# Patient Record
Sex: FEMALE | Race: OTHER | NOT HISPANIC OR LATINO
[De-identification: names, ages, dates, MRNs, and addresses within clinical notes are randomized per-mention and may not be internally consistent; named-entity substitution may affect disease eponyms.]

---

## 2020-12-21 PROBLEM — Z00.00 ENCOUNTER FOR PREVENTIVE HEALTH EXAMINATION: Status: ACTIVE | Noted: 2020-12-21

## 2020-12-28 ENCOUNTER — APPOINTMENT (OUTPATIENT)
Dept: ANTEPARTUM | Facility: CLINIC | Age: 40
End: 2020-12-28
Payer: COMMERCIAL

## 2020-12-28 ENCOUNTER — ASOB RESULT (OUTPATIENT)
Age: 40
End: 2020-12-28

## 2020-12-28 PROCEDURE — 99072 ADDL SUPL MATRL&STAF TM PHE: CPT

## 2020-12-28 PROCEDURE — 76811 OB US DETAILED SNGL FETUS: CPT

## 2021-03-04 ENCOUNTER — OUTPATIENT (OUTPATIENT)
Dept: OUTPATIENT SERVICES | Facility: HOSPITAL | Age: 41
LOS: 1 days | End: 2021-03-04
Payer: COMMERCIAL

## 2021-03-04 ENCOUNTER — EMERGENCY (EMERGENCY)
Facility: HOSPITAL | Age: 41
LOS: 1 days | End: 2021-03-04
Attending: EMERGENCY MEDICINE
Payer: COMMERCIAL

## 2021-03-04 VITALS
SYSTOLIC BLOOD PRESSURE: 123 MMHG | HEART RATE: 82 BPM | RESPIRATION RATE: 18 BRPM | OXYGEN SATURATION: 100 % | TEMPERATURE: 98 F | DIASTOLIC BLOOD PRESSURE: 71 MMHG

## 2021-03-04 VITALS
RESPIRATION RATE: 16 BRPM | HEART RATE: 80 BPM | SYSTOLIC BLOOD PRESSURE: 117 MMHG | DIASTOLIC BLOOD PRESSURE: 75 MMHG | TEMPERATURE: 99 F

## 2021-03-04 VITALS
HEIGHT: 63 IN | TEMPERATURE: 98 F | WEIGHT: 158.07 LBS | RESPIRATION RATE: 20 BRPM | SYSTOLIC BLOOD PRESSURE: 150 MMHG | DIASTOLIC BLOOD PRESSURE: 92 MMHG | OXYGEN SATURATION: 99 % | HEART RATE: 91 BPM

## 2021-03-04 DIAGNOSIS — O26.899 OTHER SPECIFIED PREGNANCY RELATED CONDITIONS, UNSPECIFIED TRIMESTER: ICD-10-CM

## 2021-03-04 DIAGNOSIS — Z3A.00 WEEKS OF GESTATION OF PREGNANCY NOT SPECIFIED: ICD-10-CM

## 2021-03-04 DIAGNOSIS — N60.02 SOLITARY CYST OF LEFT BREAST: Chronic | ICD-10-CM

## 2021-03-04 LAB
ALBUMIN SERPL ELPH-MCNC: 3.3 G/DL — SIGNIFICANT CHANGE UP (ref 3.3–5)
ALP SERPL-CCNC: 101 U/L — SIGNIFICANT CHANGE UP (ref 40–120)
ALT FLD-CCNC: 8 U/L — LOW (ref 10–45)
ANION GAP SERPL CALC-SCNC: 11 MMOL/L — SIGNIFICANT CHANGE UP (ref 5–17)
APPEARANCE UR: ABNORMAL
APTT BLD: 28.4 SEC — SIGNIFICANT CHANGE UP (ref 27.5–35.5)
AST SERPL-CCNC: 12 U/L — SIGNIFICANT CHANGE UP (ref 10–40)
BASOPHILS # BLD AUTO: 0.01 K/UL — SIGNIFICANT CHANGE UP (ref 0–0.2)
BASOPHILS NFR BLD AUTO: 0.1 % — SIGNIFICANT CHANGE UP (ref 0–2)
BILIRUB SERPL-MCNC: 0.3 MG/DL — SIGNIFICANT CHANGE UP (ref 0.2–1.2)
BILIRUB UR-MCNC: NEGATIVE — SIGNIFICANT CHANGE UP
BUN SERPL-MCNC: 7 MG/DL — SIGNIFICANT CHANGE UP (ref 7–23)
CALCIUM SERPL-MCNC: 9.1 MG/DL — SIGNIFICANT CHANGE UP (ref 8.4–10.5)
CHLORIDE SERPL-SCNC: 106 MMOL/L — SIGNIFICANT CHANGE UP (ref 96–108)
CO2 SERPL-SCNC: 21 MMOL/L — LOW (ref 22–31)
COLOR SPEC: YELLOW — SIGNIFICANT CHANGE UP
CREAT ?TM UR-MCNC: 305 MG/DL — SIGNIFICANT CHANGE UP
CREAT SERPL-MCNC: 0.81 MG/DL — SIGNIFICANT CHANGE UP (ref 0.5–1.3)
DIFF PNL FLD: NEGATIVE — SIGNIFICANT CHANGE UP
EOSINOPHIL # BLD AUTO: 0.04 K/UL — SIGNIFICANT CHANGE UP (ref 0–0.5)
EOSINOPHIL NFR BLD AUTO: 0.3 % — SIGNIFICANT CHANGE UP (ref 0–6)
FIBRINOGEN PPP-MCNC: 792 MG/DL — HIGH (ref 290–520)
GLUCOSE SERPL-MCNC: 91 MG/DL — SIGNIFICANT CHANGE UP (ref 70–99)
GLUCOSE UR QL: NEGATIVE — SIGNIFICANT CHANGE UP
HCT VFR BLD CALC: 33.3 % — LOW (ref 34.5–45)
HGB BLD-MCNC: 10.8 G/DL — LOW (ref 11.5–15.5)
IMM GRANULOCYTES NFR BLD AUTO: 0.4 % — SIGNIFICANT CHANGE UP (ref 0–1.5)
INR BLD: 0.98 RATIO — SIGNIFICANT CHANGE UP (ref 0.88–1.16)
KETONES UR-MCNC: SIGNIFICANT CHANGE UP
LDH SERPL L TO P-CCNC: 138 U/L — SIGNIFICANT CHANGE UP (ref 50–242)
LEUKOCYTE ESTERASE UR-ACNC: NEGATIVE — SIGNIFICANT CHANGE UP
LYMPHOCYTES # BLD AUTO: 17.6 % — SIGNIFICANT CHANGE UP (ref 13–44)
LYMPHOCYTES # BLD AUTO: 2.04 K/UL — SIGNIFICANT CHANGE UP (ref 1–3.3)
MCHC RBC-ENTMCNC: 30 PG — SIGNIFICANT CHANGE UP (ref 27–34)
MCHC RBC-ENTMCNC: 32.4 GM/DL — SIGNIFICANT CHANGE UP (ref 32–36)
MCV RBC AUTO: 92.5 FL — SIGNIFICANT CHANGE UP (ref 80–100)
MONOCYTES # BLD AUTO: 0.59 K/UL — SIGNIFICANT CHANGE UP (ref 0–0.9)
MONOCYTES NFR BLD AUTO: 5.1 % — SIGNIFICANT CHANGE UP (ref 2–14)
NEUTROPHILS # BLD AUTO: 8.86 K/UL — HIGH (ref 1.8–7.4)
NEUTROPHILS NFR BLD AUTO: 76.5 % — SIGNIFICANT CHANGE UP (ref 43–77)
NITRITE UR-MCNC: NEGATIVE — SIGNIFICANT CHANGE UP
NRBC # BLD: 0 /100 WBCS — SIGNIFICANT CHANGE UP (ref 0–0)
PH UR: 6.5 — SIGNIFICANT CHANGE UP (ref 5–8)
PLATELET # BLD AUTO: 297 K/UL — SIGNIFICANT CHANGE UP (ref 150–400)
POTASSIUM SERPL-MCNC: 3.8 MMOL/L — SIGNIFICANT CHANGE UP (ref 3.5–5.3)
POTASSIUM SERPL-SCNC: 3.8 MMOL/L — SIGNIFICANT CHANGE UP (ref 3.5–5.3)
PROT ?TM UR-MCNC: 32 MG/DL — HIGH (ref 0–12)
PROT SERPL-MCNC: 6.4 G/DL — SIGNIFICANT CHANGE UP (ref 6–8.3)
PROT UR-MCNC: ABNORMAL
PROT/CREAT UR-RTO: 0.1 RATIO — SIGNIFICANT CHANGE UP (ref 0–0.2)
PROTHROM AB SERPL-ACNC: 11.8 SEC — SIGNIFICANT CHANGE UP (ref 10.6–13.6)
RBC # BLD: 3.6 M/UL — LOW (ref 3.8–5.2)
RBC # FLD: 12.1 % — SIGNIFICANT CHANGE UP (ref 10.3–14.5)
SODIUM SERPL-SCNC: 138 MMOL/L — SIGNIFICANT CHANGE UP (ref 135–145)
SP GR SPEC: 1.03 — HIGH (ref 1.01–1.02)
URATE SERPL-MCNC: 3.4 MG/DL — SIGNIFICANT CHANGE UP (ref 2.5–7)
UROBILINOGEN FLD QL: ABNORMAL
WBC # BLD: 11.59 K/UL — HIGH (ref 3.8–10.5)
WBC # FLD AUTO: 11.59 K/UL — HIGH (ref 3.8–10.5)

## 2021-03-04 PROCEDURE — 99283 EMERGENCY DEPT VISIT LOW MDM: CPT

## 2021-03-04 PROCEDURE — 85610 PROTHROMBIN TIME: CPT

## 2021-03-04 PROCEDURE — 85730 THROMBOPLASTIN TIME PARTIAL: CPT

## 2021-03-04 PROCEDURE — 84156 ASSAY OF PROTEIN URINE: CPT

## 2021-03-04 PROCEDURE — G0463: CPT

## 2021-03-04 PROCEDURE — 59025 FETAL NON-STRESS TEST: CPT

## 2021-03-04 PROCEDURE — 84550 ASSAY OF BLOOD/URIC ACID: CPT

## 2021-03-04 PROCEDURE — 83615 LACTATE (LD) (LDH) ENZYME: CPT

## 2021-03-04 PROCEDURE — 99284 EMERGENCY DEPT VISIT MOD MDM: CPT

## 2021-03-04 PROCEDURE — 80053 COMPREHEN METABOLIC PANEL: CPT

## 2021-03-04 PROCEDURE — 85025 COMPLETE CBC W/AUTO DIFF WBC: CPT

## 2021-03-04 PROCEDURE — 87086 URINE CULTURE/COLONY COUNT: CPT

## 2021-03-04 PROCEDURE — 85384 FIBRINOGEN ACTIVITY: CPT

## 2021-03-04 PROCEDURE — 81001 URINALYSIS AUTO W/SCOPE: CPT

## 2021-03-04 PROCEDURE — 82570 ASSAY OF URINE CREATININE: CPT

## 2021-03-04 RX ORDER — FAMOTIDINE 10 MG/ML
20 INJECTION INTRAVENOUS ONCE
Refills: 0 | Status: COMPLETED | OUTPATIENT
Start: 2021-03-04 | End: 2021-03-04

## 2021-03-04 RX ADMIN — FAMOTIDINE 20 MILLIGRAM(S): 10 INJECTION INTRAVENOUS at 23:07

## 2021-03-04 NOTE — OB PROVIDER TRIAGE NOTE - NSHPLABSRESULTS_GEN_ALL_CORE
10.8   11.59 )-----------( 297      ( 04 Mar 2021 21:49 )             33.3     03-04    138  |  106  |  7   ----------------------------<  91  3.8   |  21<L>  |  0.81    Ca    9.1      04 Mar 2021 21:49    TPro  6.4  /  Alb  3.3  /  TBili  0.3  /  DBili  x   /  AST  12  /  ALT  8<L>  /  AlkPhos  101  03-04      LIVER FUNCTIONS - ( 04 Mar 2021 21:49 )  Alb: 3.3 g/dL / Pro: 6.4 g/dL / ALK PHOS: 101 U/L / ALT: 8 U/L / AST: 12 U/L / GGT: x         PT/INR - ( 04 Mar 2021 21:49 )   PT: 11.8 sec;   INR: 0.98 ratio    PTT - ( 04 Mar 2021 21:49 )  PTT:28.4 sec    Urinalysis Basic - ( 04 Mar 2021 21:49 )  Color: Yellow / Appearance: Slightly Turbid / S.030 / pH: x  Gluc: x / Ketone: Trace  / Bili: Negative / Urobili: 2 mg/dL   Blood: x / Protein: 30 mg/dL / Nitrite: Negative   Leuk Esterase: Negative / RBC: 1 /hpf / WBC 31 /HPF   Sq Epi: x / Non Sq Epi: 1 /hpf / Bacteria: Moderate

## 2021-03-04 NOTE — ED PROVIDER NOTE - NS ED ROS FT
GENERAL: No fever or chills  EYES: No change in vision  HEENT: No trouble swallowing or speaking  CARDIAC: No chest pain  PULMONARY: No cough or SOB  GI: +abdominal pain, +nausea, +diarrhea; no vomiting or constipation  : No changes in urination  SKIN: No rashes  NEURO: No headache, no numbness  MSK: No joint pain  Otherwise as HPI or negative.

## 2021-03-04 NOTE — OB PROVIDER TRIAGE NOTE - HISTORY OF PRESENT ILLNESS
41y  @ 31w3d GA by YADIRA 5/3/21, presents c/o intermittent RLQ pain since Monday.     wks presenting for . PNC Uncomplicated. Endorses good FM. Denies LOF/VB/frequent Ctx. GBS Neg. EFW      EFW  GBS  PNC    OBHx:   GYNHx:   PMH:   PSH:  Meds:  All:  Soc: No EtOH, tobacco, illicit drug use in pregnancy  Psych:   FHx: No bleeding/clotting disorders in pregnancy    T(C): 36.7 (21 @ 21:04), Max: 36.7 (21 @ 21:03)  HR: 81 (21 @ 22:54) (74 - 93)  BP: 106/70 (21 @ 22:54) (106/70 - 150/92)  RR: 18 (21 @ 21:04) (18 - 20)  SpO2: 99% (21 @ 22:52) (97% - 100%)  Sono: Vtx  VE:  EFM: FHR  Rienzi: Ctx Qmin      A/P: Pt is a 41y GP @ wks presenting for.  -Admit to L&D, Routine labs, IVF  -IOL With:   -EFM + Rienzi    D/w Dr. Kelly De Dios PGY- 41y  @ 31w3d GA by YADIRA 5/3/21, presents c/o intermittent RLQ pain since Monday. Pain is mostly dull, with intermittent periods of sharp pain that lasts a few seconds. The pain is non-radiating and worsens with movement and when she lays on the right side. Tylenol and warm compress improve the pain. Patient reports intermittent nausea throughout her pregnancy and denies changes in the baseline nausea. Patient denies associated fevers/chills, vomiting, dysuria, VB, LOF, ctx. She had one episode of diarrhea today after eating halal food. Endorses good fetal movement.    Patient had an elevated BP in the ED. Denies symptoms of PEC.     PNC Uncomplicated.   Endorses good FM. Denies LOF/VB/frequent Ctx. GBS Neg. EFW    EFW uknown  GBS unknown    OBH: current   GYNH: status post left breast cyst excision (benign); denies fibroids/cysts/abnormal paps  PM/SH: denies  Meds: ASA 1.5 tabs, PNV  All: diflucan (hives)  Soc: Denies alcohol, tobacco, illicit drug use in pregnancy

## 2021-03-04 NOTE — ED PROVIDER NOTE - PHYSICAL EXAMINATION
Gen: NAD, A&Ox4. Non-toxic appearing  HEENT: Normocephalic and atraumatic. EOMI, no nasal discharge, mucous membranes moist, no scleral icterus.  CV: Regular rate and rhythm, +S1/S2, no M/R/G. No significant lower extremity edema. Radial and DP pulses present and symmetrical. Capillary refill less than 2 seconds.  Resp: Normal effort and rate. CTAB, no rales, rhonchi, or wheezes.  GI: Abdomen soft, uterine fundus palpated about 6 cm. above the umbilicus, moderately tender to palpation at RLQ. Bowel sounds present.  using fetal doppler.  MSK: No open wounds and no bruising  Neuro: Following commands, speaking in full sentences, moving extremities spontaneously.  Psych: Appropriate mood, cooperative

## 2021-03-04 NOTE — OB PROVIDER TRIAGE NOTE - NSOBPROVIDERNOTE_OBGYN_ALL_OB_FT
A/P: 41y  @ 31w3d GA, presents c/o intermittent RLQ pain x4 days. BP was elevated in the ED but multiple values were normal in the OB triage. Physical exam significant for mild RLQ pain, but abdomen non-peritoneal. CL >3cm. NST reactive. No evidence of PTL at this time. DDX includes MSK pain, UTI; low suspicion for infectious eitiology such as appendicitis or chorioamnionitis, given no fever; low suspicion for torsion given unremarkable exam and no h/o cysts.     -Stat HELLP labs sent  -UA/Ucx, P/C ratio   -Continuos EFM/[place]  -NPO   -Reassess for change in symptoms/exam    ADDENDUM (11pm):   -Patient reports she feels well. Abdomen remains soft, mildly tender in RLQ.   -Labs reviewed. HELLP labs wnl, P/C 0.1. BPs normotensive. UA notable for elevated specific gravity, moderate bacteria and WBC 31 - however neg leuks and nitrites.   -Will f/u Ucx outpatient     Pain attributed to likely MSK origin; advised Tylenol prn and warm compress for pain.    Patient cleared for discharge.   Return precautions given.     D/w Dr. Naida De Dios PGY-3

## 2021-03-04 NOTE — ED PROVIDER NOTE - OBJECTIVE STATEMENT
The patient is a 41y  Female at 31 weeks gestation complaining of RLQ pain for the last 4 days. 1st pregnancy. 4 days ago, pt started feeling dull RLQ pain while at rest. Made worse with lying on right side and touching RLQ. Has been taking Tylenol and using hot compresses. Pain is 5/10, hasn't worsened but hasn't gotten better. Associated nausea for the last few days, no vomiting. One episode of watery diarrhea after eating food with  today. Pain occasionally becomes sharp. No fever, sob, cp. No vaginal bleeding. Delivery date is May 3. Last US was 2 weeks ago, showed normal pregnancy. No complications throughout pregnancy. Ob/gyn is at [facility].

## 2021-03-04 NOTE — ED ADULT NURSE NOTE - PAIN RATING/NUMBER SCALE (0-10): REST
patient c/o sob and bilateral leg swelling for 3 months . Denies any chest pain . Was sent by PMD for evaluation . 5

## 2021-03-04 NOTE — ED PROVIDER NOTE - CLINICAL SUMMARY MEDICAL DECISION MAKING FREE TEXT BOX
Adult female 31wk gest. w 4d of abd pain, Discussed with Dr Alonso transfer directly to L&D Adult female 31wk gest. w 4d of abd pain, Discussed with Dr Alonso transfer directly to L&D    Lopez Moran MD (PGY-1): The patient is a 41y  Female at 31 weeks gestation complaining of RLQ pain for the last 4 days. No vaginal bleeding, pregnancy has been normal up to this point. Lower suspicion for pregnancy complication like placenta previa or placental abruption. DDx also includes appendicitis, UTI, kidney stone. Will get labs, spoke to Dr. Alonso (ob/gyn). Pt will be transferred to L&D.

## 2021-03-04 NOTE — ED PROVIDER NOTE - ATTENDING CONTRIBUTION TO CARE
Discussed with   41y female pmh Neg, Now 31w gest comes to ed c/o 4d hx of rlq abd pain. Nausea without vomiting. Diarreha x1 today. No vag bleeding cp, fever, chills.sob cp, vag dc. Pt had spoken to pmd and was told if pain worsened to ED. PE WDWN female awake alert ncat neck supple chest clear cv no rubs, gallops or murmurs abd protuberant cw gravid uterus, +rlq ttp. Plap mass above umbilicus cw gravid uterus. Neuro no focal defects  Sanjeev Chandler MD, Facep

## 2021-03-04 NOTE — ED ADULT NURSE NOTE - OBJECTIVE STATEMENT
41y F, A&Ox4, ambulatory, , presents to the ED c/o RLQ pain. Pt is 31 Weeks pregnant, last US last thursday- no abnormal findings, c/o dull RLQ pain intermittently becomes sharp. Associated with nausea, and decreased appetite, no vomiting. Gross neuro intact, lungs cta bilaterally, no difficulty speaking in complete sentences, s1s2 heart sounds heard, pulses x 4, moving all extremities, abdomen soft, slightly tender nondistended, skin intact. Pt denies fevers/chills, numbness/tingling, weakness, headache, dizziness, vision changes, cp, sob, cough, v/d, dysuria, hematuria, bloody stools, back pain.

## 2021-03-04 NOTE — OB PROVIDER TRIAGE NOTE - NSHPPHYSICALEXAM_GEN_ALL_CORE
T(C): 36.7 (03-04-21 @ 21:04), Max: 36.7 (03-04-21 @ 21:03)  HR: 81 (03-04-21 @ 22:54) (74 - 93)  BP: 106/70 (03-04-21 @ 22:54) (106/70 - 150/92)  RR: 18 (03-04-21 @ 21:04) (18 - 20)  SpO2: 99% (03-04-21 @ 22:52) (97% - 100%)    Gen: NAD   Abd: soft, gravid, mildly tender in RLQ, no rebound or guarding  TAUS: vertex, posterior placenta, MVP 4, +gross fetal movement   TVUS: CL 3.6-3.8, no funneling, no dynamic changes with suprapubic pressure  SSE: cervix closed, no blood in vault, no pooling in the vault  SVE: closed/long/posterior  EFM: 140/mod/+accels/-deccels  [place]: irritability

## 2021-03-06 LAB
CULTURE RESULTS: SIGNIFICANT CHANGE UP
SPECIMEN SOURCE: SIGNIFICANT CHANGE UP

## 2021-05-03 ENCOUNTER — TRANSCRIPTION ENCOUNTER (OUTPATIENT)
Age: 41
End: 2021-05-03

## 2021-05-04 ENCOUNTER — INPATIENT (INPATIENT)
Facility: HOSPITAL | Age: 41
LOS: 2 days | Discharge: HOME | End: 2021-05-07
Attending: OBSTETRICS & GYNECOLOGY | Admitting: OBSTETRICS & GYNECOLOGY
Payer: COMMERCIAL

## 2021-05-04 ENCOUNTER — TRANSCRIPTION ENCOUNTER (OUTPATIENT)
Age: 41
End: 2021-05-04

## 2021-05-04 VITALS
HEART RATE: 81 BPM | DIASTOLIC BLOOD PRESSURE: 88 MMHG | RESPIRATION RATE: 18 BRPM | TEMPERATURE: 98 F | OXYGEN SATURATION: 100 % | SYSTOLIC BLOOD PRESSURE: 120 MMHG

## 2021-05-04 DIAGNOSIS — O26.899 OTHER SPECIFIED PREGNANCY RELATED CONDITIONS, UNSPECIFIED TRIMESTER: ICD-10-CM

## 2021-05-04 DIAGNOSIS — N60.02 SOLITARY CYST OF LEFT BREAST: Chronic | ICD-10-CM

## 2021-05-04 DIAGNOSIS — Z34.80 ENCOUNTER FOR SUPERVISION OF OTHER NORMAL PREGNANCY, UNSPECIFIED TRIMESTER: ICD-10-CM

## 2021-05-04 DIAGNOSIS — Z3A.00 WEEKS OF GESTATION OF PREGNANCY NOT SPECIFIED: ICD-10-CM

## 2021-05-04 LAB
BASOPHILS # BLD AUTO: 0.02 K/UL — SIGNIFICANT CHANGE UP (ref 0–0.2)
BASOPHILS NFR BLD AUTO: 0.1 % — SIGNIFICANT CHANGE UP (ref 0–2)
BLD GP AB SCN SERPL QL: NEGATIVE — SIGNIFICANT CHANGE UP
COVID-19 SPIKE DOMAIN AB INTERP: NEGATIVE — SIGNIFICANT CHANGE UP
COVID-19 SPIKE DOMAIN ANTIBODY RESULT: 0.4 U/ML — SIGNIFICANT CHANGE UP
EOSINOPHIL # BLD AUTO: 0.02 K/UL — SIGNIFICANT CHANGE UP (ref 0–0.5)
EOSINOPHIL NFR BLD AUTO: 0.1 % — SIGNIFICANT CHANGE UP (ref 0–6)
HCT VFR BLD CALC: 35.2 % — SIGNIFICANT CHANGE UP (ref 34.5–45)
HGB BLD-MCNC: 11 G/DL — LOW (ref 11.5–15.5)
IMM GRANULOCYTES NFR BLD AUTO: 0.6 % — SIGNIFICANT CHANGE UP (ref 0–1.5)
LYMPHOCYTES # BLD AUTO: 16.7 % — SIGNIFICANT CHANGE UP (ref 13–44)
LYMPHOCYTES # BLD AUTO: 2.29 K/UL — SIGNIFICANT CHANGE UP (ref 1–3.3)
MCHC RBC-ENTMCNC: 27.9 PG — SIGNIFICANT CHANGE UP (ref 27–34)
MCHC RBC-ENTMCNC: 31.3 GM/DL — LOW (ref 32–36)
MCV RBC AUTO: 89.3 FL — SIGNIFICANT CHANGE UP (ref 80–100)
MONOCYTES # BLD AUTO: 0.72 K/UL — SIGNIFICANT CHANGE UP (ref 0–0.9)
MONOCYTES NFR BLD AUTO: 5.2 % — SIGNIFICANT CHANGE UP (ref 2–14)
NEUTROPHILS # BLD AUTO: 10.62 K/UL — HIGH (ref 1.8–7.4)
NEUTROPHILS NFR BLD AUTO: 77.3 % — HIGH (ref 43–77)
NRBC # BLD: 0 /100 WBCS — SIGNIFICANT CHANGE UP (ref 0–0)
PLATELET # BLD AUTO: 361 K/UL — SIGNIFICANT CHANGE UP (ref 150–400)
RBC # BLD: 3.94 M/UL — SIGNIFICANT CHANGE UP (ref 3.8–5.2)
RBC # FLD: 12.9 % — SIGNIFICANT CHANGE UP (ref 10.3–14.5)
RH IG SCN BLD-IMP: POSITIVE — SIGNIFICANT CHANGE UP
RH IG SCN BLD-IMP: POSITIVE — SIGNIFICANT CHANGE UP
SARS-COV-2 IGG+IGM SERPL QL IA: 0.4 U/ML — SIGNIFICANT CHANGE UP
SARS-COV-2 IGG+IGM SERPL QL IA: NEGATIVE — SIGNIFICANT CHANGE UP
SARS-COV-2 RNA SPEC QL NAA+PROBE: SIGNIFICANT CHANGE UP
T PALLIDUM AB TITR SER: NEGATIVE — SIGNIFICANT CHANGE UP
WBC # BLD: 13.75 K/UL — HIGH (ref 3.8–10.5)
WBC # FLD AUTO: 13.75 K/UL — HIGH (ref 3.8–10.5)

## 2021-05-04 RX ORDER — OXYTOCIN 10 UNIT/ML
333.33 VIAL (ML) INJECTION
Qty: 20 | Refills: 0 | Status: DISCONTINUED | OUTPATIENT
Start: 2021-05-04 | End: 2021-05-05

## 2021-05-04 RX ORDER — CITRIC ACID/SODIUM CITRATE 300-500 MG
15 SOLUTION, ORAL ORAL EVERY 6 HOURS
Refills: 0 | Status: DISCONTINUED | OUTPATIENT
Start: 2021-05-04 | End: 2021-05-05

## 2021-05-04 RX ORDER — SODIUM CHLORIDE 9 MG/ML
1000 INJECTION, SOLUTION INTRAVENOUS
Refills: 0 | Status: DISCONTINUED | OUTPATIENT
Start: 2021-05-04 | End: 2021-05-05

## 2021-05-04 RX ADMIN — Medication 15 MILLILITER(S): at 20:24

## 2021-05-04 RX ADMIN — SODIUM CHLORIDE 125 MILLILITER(S): 9 INJECTION, SOLUTION INTRAVENOUS at 22:19

## 2021-05-04 RX ADMIN — SODIUM CHLORIDE 125 MILLILITER(S): 9 INJECTION, SOLUTION INTRAVENOUS at 16:35

## 2021-05-04 RX ADMIN — SODIUM CHLORIDE 125 MILLILITER(S): 9 INJECTION, SOLUTION INTRAVENOUS at 13:39

## 2021-05-04 NOTE — PRE-ANESTHESIA EVALUATION ADULT - NSANTHVITALSIGNSFT_GEN_ALL_CORE
Vital Signs Last 24 Hrs  T(C): 36.7 (04 May 2021 14:14), Max: 36.7 (04 May 2021 09:47)  T(F): 98.06 (04 May 2021 14:14), Max: 98.1 (04 May 2021 09:50)  HR: 104 (04 May 2021 14:39) (66 - 104)  BP: 120/88 (04 May 2021 11:44) (120/88 - 120/88)  BP(mean): --  RR: 18 (04 May 2021 14:14) (18 - 18)  SpO2: 100% (04 May 2021 14:39) (97% - 100%)

## 2021-05-04 NOTE — OB PROVIDER H&P - NSLASTDATEVISIT_OBGYN_ALL_OB
Telephone Encounter by Thais Benavides RN, BSN at 06/19/18 04:25 PM     Author:  Thais Benavides RN, BSN Service:  (none) Author Type:  Registered Nurse     Filed:  06/19/18 04:25 PM Encounter Date:  6/19/2018 Status:  Signed     :  Thais Benavides RN, BSN (Registered Nurse)            Printed and put in MFM binder for review.[SS1.1M]       Revision History        User Key Date/Time User Provider Type Action    > SS1.1 06/19/18 04:25 PM Thais Benavides RN, BSN Registered Nurse Sign    M - Manual             Known

## 2021-05-04 NOTE — OB PROVIDER H&P - ASSESSMENT
42 yo  @40.1 weeks presenting with some cxns for induction of labor for advanced maternal age  admit to L&D  iv fluids  routine labs   npo bictra  efm,toco  anesthesia consult  bedside sono confirmed cephalic  cytototec PO, cervical balloon for induction  anticiapted   DW Dr Townsend who was at bedside  COLLIN Fagan PA-C

## 2021-05-04 NOTE — OB PROVIDER H&P - ATTENDING COMMENTS
Patient is a 42yo at 40wks  PNC uncomplicated  GBS neg  In early labor but given late gestational age, will admit for IOL  Plan for CB and PO cyto  Epidural PRN    estevan garza

## 2021-05-04 NOTE — OB PROVIDER TRIAGE NOTE - NSHPPHYSICALEXAM_GEN_ALL_CORE
pe  vsVital Signs Last 24 Hrs  T(C): 36.7 (04 May 2021 09:50), Max: 36.7 (04 May 2021 09:47)  T(F): 98.1 (04 May 2021 09:50), Max: 98.1 (04 May 2021 09:50)  HR: 71 (04 May 2021 10:52) (71 - 83)  BP: 120/88 (04 May 2021 09:50) (120/88 - 120/88)  BP(mean): --  RR: 18 (04 May 2021 09:50) (18 - 18)  SpO2: 99% (04 May 2021 10:52) (99% - 100%)    gen- a&ox 3 well develoepd well noursihed   cv- rrr s1 s2 lungs cta bl  abd gravid soft   ve 0.5/80/-2 int int  sse- neg pooling, neg ferning neg nitrazine  bedside sono confirmed cephalic JAZIEL 8.14 BPP 8/8

## 2021-05-04 NOTE — PRE-ANESTHESIA EVALUATION ADULT - NSANTHPMHFT_GEN_ALL_CORE
Heart murmur ( diagnosed as Teen , FH of mumur ( mom, sister) ) asymptomatic , never had ECHO done, Pt denies: LE Pain, Bleeding Disorders ( including FH) Allergies: Diflucan

## 2021-05-04 NOTE — OB PROVIDER H&P - HISTORY OF PRESENT ILLNESS
40 yo  @40.1 weeks presenting reporting cxns q 6 min 10/10 pain level. pt reports vaginal spotting. pt reports leaking when she was about to be examined. pt reports pos fm. pnc uncomplicated.  pt for admission for induction of labor advanced maternal age     gbs neg efw by leopold 3200  allergies- diflucan rash  meds- PNV, VitD  med hx- heart murmur last evaluated 1 years ago with EKG at PCP. pt denies echo  pt denies pulm/heme/gi/neuro/psych  ob hx- current as above.  "growth of skin over areola" pt had breast US during pregnancy for follow up for MRI after complete breast feeding.   gyn hx- hx of abnl pap pos HPV colposcopy WNL. pap WNL x10 years  pt denies cysts/fibroids  sx hx- left breast cyst removal. pt denies reaction to anesthesia  fam hx- HTN- mother  social hx- etoh 1-2 / month. pt denies tob/illicit drug use

## 2021-05-04 NOTE — OB PROVIDER TRIAGE NOTE - NS_FINALEDD_OBGYN_ALL_OB_DT
[place] [address]
Phone:  [phone]                     ELECTROCARDIOGRAM REPORT      
_______________________________________________________________________________
 
Name:       ANABELLE SHIELDS              Room:                      [facility]#:  [id number]      Account #:      [id number]  
Admission:  18     Attend Phys:                         
Discharge:  18     Date of Birth:  [birth year]  
         Report #: [id number]
    [id number]
_______________________________________________________________________________
THIS REPORT FOR:  //name//                      
 
                         [facility] ED
                                       
Test Date:    2018               Test Time:    15:32:14
Pat Name:     ANABELLE SHIELDS          Department:   
Patient ID:   [id number]            Room:          
Gender:       F                        Technician:   URMILA RUTLEDGE
:          1989               Requested By: Loree Sung
Order Number: [id number] MD:   Easton Castaneda
                                 Measurements
Intervals                              Axis          
Rate:         91                       P:            52
CT:           155                      QRS:          52
QRSD:         103                      T:            1
QT:           381                                    
QTc:          469                                    
                           Interpretive Statements
Sinus rhythm
Borderline T abnormalities, anterior leads
Compared to ECG 2017 10:45:40
no change
 
Electronically Signed On 3-6-2018 12:44:59 CST by Easton Castaneda
[email or web address]
 
 
 
 
 
 
 
 
 
 
 
 
 
 
 
 
 
 
  <ELECTRONICALLY SIGNED>
                                           By: Easton Castaneda MD, [facility]      
  18     1244
D: 18 1532   _____________________________________
T: 18 1532   Easton Castaneda MD, FAC        /EPI 03-May-2021

## 2021-05-04 NOTE — OB PROVIDER H&P - NSHPPHYSICALEXAM_GEN_ALL_CORE
Vital Signs Last 24 Hrs  T(C): 36.7 (04 May 2021 09:50), Max: 36.7 (04 May 2021 09:47)  T(F): 98.1 (04 May 2021 09:50), Max: 98.1 (04 May 2021 09:50)  HR: 70 (04 May 2021 11:37) (66 - 83)  BP: 120/88 (04 May 2021 09:50) (120/88 - 120/88)  BP(mean): --  RR: 18 (04 May 2021 09:50) (18 - 18)  SpO2: 100% (04 May 2021 11:42) (97% - 100%)    gen- a&ox3 well develoepd wlel nourished nad  cv- rrr s1 s2 ana gs cta bl  abd gravid soft  ve 0.5/80/-2 int int  bedside sono cnofirmed cephalic

## 2021-05-04 NOTE — OB PROVIDER TRIAGE NOTE - HISTORY OF PRESENT ILLNESS
42 yo  @40.1 weeks presenting reporting cxns q 6 min 10/10 pain level. pt reports vaginal spotting. pt reports leaking when she was about to be examined. pt reports pos fm. pnc uncomplicated. gbs neg efw by leopold 3200  allergies- diflucan rash  meds- PNV, VitD  med hx- heart murmur last evaluated 1 years ago with EKG at PCP. pt denies echo  pt denies pulm/heme/gi/neuro/psych  ob hx- current as above.  "growth of skin over areola" pt had breast US during pregnancy for follow up for MRI after complete breast feeding.   gyn hx- hx of abnl pap pos HPV colposcopy WNL. pap WNL x10 years  pt denies cysts/fibroids  sx hx- left breast cyst removal. pt denies reaction to anesthesia  fam hx- HTN- mother  social hx- etoh 1-2 / month. pt denies tob/illicit drug use

## 2021-05-05 PROCEDURE — 59514 CESAREAN DELIVERY ONLY: CPT | Mod: AS

## 2021-05-05 RX ORDER — IBUPROFEN 200 MG
600 TABLET ORAL EVERY 6 HOURS
Refills: 0 | Status: COMPLETED | OUTPATIENT
Start: 2021-05-05 | End: 2022-04-03

## 2021-05-05 RX ORDER — OXYCODONE HYDROCHLORIDE 5 MG/1
5 TABLET ORAL
Refills: 0 | Status: DISCONTINUED | OUTPATIENT
Start: 2021-05-05 | End: 2021-05-06

## 2021-05-05 RX ORDER — OXYCODONE HYDROCHLORIDE 5 MG/1
5 TABLET ORAL ONCE
Refills: 0 | Status: DISCONTINUED | OUTPATIENT
Start: 2021-05-05 | End: 2021-05-07

## 2021-05-05 RX ORDER — HEPARIN SODIUM 5000 [USP'U]/ML
5000 INJECTION INTRAVENOUS; SUBCUTANEOUS EVERY 12 HOURS
Refills: 0 | Status: DISCONTINUED | OUTPATIENT
Start: 2021-05-05 | End: 2021-05-07

## 2021-05-05 RX ORDER — SIMETHICONE 80 MG/1
80 TABLET, CHEWABLE ORAL EVERY 4 HOURS
Refills: 0 | Status: DISCONTINUED | OUTPATIENT
Start: 2021-05-05 | End: 2021-05-07

## 2021-05-05 RX ORDER — SODIUM CHLORIDE 9 MG/ML
1000 INJECTION, SOLUTION INTRAVENOUS ONCE
Refills: 0 | Status: DISCONTINUED | OUTPATIENT
Start: 2021-05-05 | End: 2021-05-07

## 2021-05-05 RX ORDER — AMPICILLIN SODIUM AND SULBACTAM SODIUM 250; 125 MG/ML; MG/ML
3 INJECTION, POWDER, FOR SUSPENSION INTRAMUSCULAR; INTRAVENOUS EVERY 6 HOURS
Refills: 0 | Status: DISCONTINUED | OUTPATIENT
Start: 2021-05-05 | End: 2021-05-05

## 2021-05-05 RX ORDER — DIPHENHYDRAMINE HCL 50 MG
25 CAPSULE ORAL EVERY 6 HOURS
Refills: 0 | Status: DISCONTINUED | OUTPATIENT
Start: 2021-05-05 | End: 2021-05-07

## 2021-05-05 RX ORDER — DEXAMETHASONE 0.5 MG/5ML
4 ELIXIR ORAL EVERY 6 HOURS
Refills: 0 | Status: DISCONTINUED | OUTPATIENT
Start: 2021-05-05 | End: 2021-05-06

## 2021-05-05 RX ORDER — ACETAMINOPHEN 500 MG
975 TABLET ORAL ONCE
Refills: 0 | Status: COMPLETED | OUTPATIENT
Start: 2021-05-05 | End: 2021-05-05

## 2021-05-05 RX ORDER — TETANUS TOXOID, REDUCED DIPHTHERIA TOXOID AND ACELLULAR PERTUSSIS VACCINE, ADSORBED 5; 2.5; 8; 8; 2.5 [IU]/.5ML; [IU]/.5ML; UG/.5ML; UG/.5ML; UG/.5ML
0.5 SUSPENSION INTRAMUSCULAR ONCE
Refills: 0 | Status: DISCONTINUED | OUTPATIENT
Start: 2021-05-05 | End: 2021-05-07

## 2021-05-05 RX ORDER — KETOROLAC TROMETHAMINE 30 MG/ML
30 SYRINGE (ML) INJECTION EVERY 6 HOURS
Refills: 0 | Status: DISCONTINUED | OUTPATIENT
Start: 2021-05-05 | End: 2021-05-07

## 2021-05-05 RX ORDER — OXYTOCIN 10 UNIT/ML
333.33 VIAL (ML) INJECTION
Qty: 20 | Refills: 0 | Status: DISCONTINUED | OUTPATIENT
Start: 2021-05-05 | End: 2021-05-07

## 2021-05-05 RX ORDER — AMPICILLIN SODIUM AND SULBACTAM SODIUM 250; 125 MG/ML; MG/ML
3 INJECTION, POWDER, FOR SUSPENSION INTRAMUSCULAR; INTRAVENOUS EVERY 6 HOURS
Refills: 0 | Status: DISCONTINUED | OUTPATIENT
Start: 2021-05-05 | End: 2021-05-06

## 2021-05-05 RX ORDER — NALOXONE HYDROCHLORIDE 4 MG/.1ML
0.1 SPRAY NASAL
Refills: 0 | Status: DISCONTINUED | OUTPATIENT
Start: 2021-05-05 | End: 2021-05-06

## 2021-05-05 RX ORDER — ACETAMINOPHEN 500 MG
975 TABLET ORAL
Refills: 0 | Status: DISCONTINUED | OUTPATIENT
Start: 2021-05-05 | End: 2021-05-07

## 2021-05-05 RX ORDER — AMPICILLIN SODIUM AND SULBACTAM SODIUM 250; 125 MG/ML; MG/ML
INJECTION, POWDER, FOR SUSPENSION INTRAMUSCULAR; INTRAVENOUS
Refills: 0 | Status: DISCONTINUED | OUTPATIENT
Start: 2021-05-05 | End: 2021-05-05

## 2021-05-05 RX ORDER — OXYTOCIN 10 UNIT/ML
4 VIAL (ML) INJECTION
Qty: 30 | Refills: 0 | Status: DISCONTINUED | OUTPATIENT
Start: 2021-05-05 | End: 2021-05-07

## 2021-05-05 RX ORDER — LANOLIN
1 OINTMENT (GRAM) TOPICAL EVERY 6 HOURS
Refills: 0 | Status: DISCONTINUED | OUTPATIENT
Start: 2021-05-05 | End: 2021-05-07

## 2021-05-05 RX ORDER — SODIUM CHLORIDE 9 MG/ML
1000 INJECTION, SOLUTION INTRAVENOUS
Refills: 0 | Status: DISCONTINUED | OUTPATIENT
Start: 2021-05-05 | End: 2021-05-07

## 2021-05-05 RX ORDER — AMPICILLIN SODIUM AND SULBACTAM SODIUM 250; 125 MG/ML; MG/ML
3 INJECTION, POWDER, FOR SUSPENSION INTRAMUSCULAR; INTRAVENOUS ONCE
Refills: 0 | Status: COMPLETED | OUTPATIENT
Start: 2021-05-05 | End: 2021-05-05

## 2021-05-05 RX ORDER — OXYCODONE HYDROCHLORIDE 5 MG/1
10 TABLET ORAL
Refills: 0 | Status: DISCONTINUED | OUTPATIENT
Start: 2021-05-05 | End: 2021-05-06

## 2021-05-05 RX ORDER — NALBUPHINE HYDROCHLORIDE 10 MG/ML
2.5 INJECTION, SOLUTION INTRAMUSCULAR; INTRAVENOUS; SUBCUTANEOUS EVERY 6 HOURS
Refills: 0 | Status: DISCONTINUED | OUTPATIENT
Start: 2021-05-05 | End: 2021-05-06

## 2021-05-05 RX ORDER — FAMOTIDINE 10 MG/ML
20 INJECTION INTRAVENOUS ONCE
Refills: 0 | Status: COMPLETED | OUTPATIENT
Start: 2021-05-05 | End: 2021-05-05

## 2021-05-05 RX ORDER — OXYCODONE HYDROCHLORIDE 5 MG/1
5 TABLET ORAL
Refills: 0 | Status: COMPLETED | OUTPATIENT
Start: 2021-05-05 | End: 2021-05-12

## 2021-05-05 RX ORDER — MORPHINE SULFATE 50 MG/1
2 CAPSULE, EXTENDED RELEASE ORAL ONCE
Refills: 0 | Status: DISCONTINUED | OUTPATIENT
Start: 2021-05-05 | End: 2021-05-06

## 2021-05-05 RX ORDER — ONDANSETRON 8 MG/1
4 TABLET, FILM COATED ORAL EVERY 6 HOURS
Refills: 0 | Status: DISCONTINUED | OUTPATIENT
Start: 2021-05-05 | End: 2021-05-06

## 2021-05-05 RX ORDER — MAGNESIUM HYDROXIDE 400 MG/1
30 TABLET, CHEWABLE ORAL
Refills: 0 | Status: DISCONTINUED | OUTPATIENT
Start: 2021-05-05 | End: 2021-05-07

## 2021-05-05 RX ADMIN — NALBUPHINE HYDROCHLORIDE 2.5 MILLIGRAM(S): 10 INJECTION, SOLUTION INTRAMUSCULAR; INTRAVENOUS; SUBCUTANEOUS at 16:22

## 2021-05-05 RX ADMIN — Medication 15 MILLILITER(S): at 07:19

## 2021-05-05 RX ADMIN — AMPICILLIN SODIUM AND SULBACTAM SODIUM 200 GRAM(S): 250; 125 INJECTION, POWDER, FOR SUSPENSION INTRAMUSCULAR; INTRAVENOUS at 04:36

## 2021-05-05 RX ADMIN — Medication 4 MILLIGRAM(S): at 14:19

## 2021-05-05 RX ADMIN — Medication 4 MILLIUNIT(S)/MIN: at 04:04

## 2021-05-05 RX ADMIN — Medication 30 MILLIGRAM(S): at 22:30

## 2021-05-05 RX ADMIN — Medication 975 MILLIGRAM(S): at 18:10

## 2021-05-05 RX ADMIN — FAMOTIDINE 20 MILLIGRAM(S): 10 INJECTION INTRAVENOUS at 01:19

## 2021-05-05 RX ADMIN — Medication 975 MILLIGRAM(S): at 19:00

## 2021-05-05 RX ADMIN — AMPICILLIN SODIUM AND SULBACTAM SODIUM 200 GRAM(S): 250; 125 INJECTION, POWDER, FOR SUSPENSION INTRAMUSCULAR; INTRAVENOUS at 18:33

## 2021-05-05 RX ADMIN — HEPARIN SODIUM 5000 UNIT(S): 5000 INJECTION INTRAVENOUS; SUBCUTANEOUS at 18:11

## 2021-05-05 RX ADMIN — Medication 30 MILLIGRAM(S): at 15:21

## 2021-05-05 RX ADMIN — ONDANSETRON 4 MILLIGRAM(S): 8 TABLET, FILM COATED ORAL at 16:43

## 2021-05-05 RX ADMIN — Medication 30 MILLIGRAM(S): at 16:21

## 2021-05-05 RX ADMIN — Medication 975 MILLIGRAM(S): at 23:59

## 2021-05-05 RX ADMIN — AMPICILLIN SODIUM AND SULBACTAM SODIUM 200 GRAM(S): 250; 125 INJECTION, POWDER, FOR SUSPENSION INTRAMUSCULAR; INTRAVENOUS at 23:59

## 2021-05-05 RX ADMIN — Medication 975 MILLIGRAM(S): at 11:35

## 2021-05-05 RX ADMIN — Medication 30 MILLIGRAM(S): at 21:02

## 2021-05-05 NOTE — OB PROVIDER LABOR PROGRESS NOTE - ASSESSMENT
A/P:  -EFM/[place]  -Continue with pitocin  -Continue with tylenol and Unasyn for IPT  -Anticipate   Plan per Dr. Evita Reeves PA-C
IOL: AMA/LTIOL  - spPO/CB  - continue pitocin  - continue Unasyn for IAI; most recent temp 37.8  - peanut ball, top off    ADEOLA Campbell, PGY-2  d/w Dr. Jama
Balloon still firmly in place.     Plan:   - Epidural top off   - Reevaluate for increased pain/pressure or when balloon is spontaneously expelled    TONNY Holland, PGY1
cont efm toco  cont current mgmt  plan per Dr Townsend
41yoF a/f IOL for AMA  - s/p PO  - Start pitocin  - Tylenol/Unasyn  - Recheck at 5a    dw Dr.Kaufman Matthew Dye PGY2 
P: Pt. w/ no progress for the past 5 hrs. w/ dilation or descent of the head. Vertex moderately molded.    Will proceed w/ P C/S for FTP. All surgical risks disc. in detail. Pt. and  agree w/ plan.    -GK

## 2021-05-05 NOTE — OB RN INTRAOPERATIVE NOTE - NSSELHIDDEN_OBGYN_ALL_OB_FT
[NS_DeliveryAttending1_OBGYN_ALL_OB_FT:[id number]],[NS_DeliveryAssist2_OBGYN_ALL_OB_FT:[id number]==],[NS_DeliveryRN_OBGYN_ALL_OB_FT:[id number]==]

## 2021-05-05 NOTE — OB PROVIDER DELIVERY SUMMARY - NSSELHIDDEN_OBGYN_ALL_OB_FT
[NS_DeliveryAttending1_OBGYN_ALL_OB_FT:[id number]],[NS_DeliveryAssist2_OBGYN_ALL_OB_FT:[id number]==],[NS_DeliveryRN_OBGYN_ALL_OB_FT:[id number]==]

## 2021-05-05 NOTE — BRIEF OPERATIVE NOTE - OPERATION/FINDINGS
viable male infant, cephalic presentation, weight; 7 lbs 8oz, apgars 9/9  normal uterus, tubes, and ovaries. Konstantin placed overtop the hysterotomy  EBL: 600  IV: 900  urine: 100

## 2021-05-05 NOTE — OB RN DELIVERY SUMMARY - NSSELHIDDEN_OBGYN_ALL_OB_FT
[NS_DeliveryAttending1_OBGYN_ALL_OB_FT:[id number]],[NS_DeliveryAssist2_OBGYN_ALL_OB_FT:[id number]==],[NS_DeliveryRN_OBGYN_ALL_OB_FT:[id number]==]

## 2021-05-05 NOTE — OB RN PREOPERATIVE CHECKLIST - SURGICAL CONSENT
Subjective:      Patient ID: Thom Krishna is a 65 y.o. female.    Chief Complaint: Diabetic Foot Exam (PCP: Oleg ) and Diabetes Mellitus    Thom is a 65 y.o. female who presents to the clinic upon referral from Dr. Dejesus  for evaluation and treatment of diabetic feet. Thom has a past medical history of Anxiety and depression (7/21/2015), Arthritis, Cervical radiculopathy (4/8/2016), Cirrhosis of liver, Colon polyps (4/27/2015), Diabetes mellitus type 2 with neurological manifestations (11/6/2015), Encounter for blood transfusion, Hepatitis C, Hip fracture, Macrocytosis (7/21/2015), Thyroid disease, and Transfusion reaction. Patient relates no major problem with feet. Only complaints today consist of toenails in need of trimming.  Denies being painful with wearing shoe gear.  Has not attempted to self treat on account of excessive length and limitations secondary to prior hip surgery.  Also, relates having tingling/burning pain in the feet while at rest.  Denies noticing said symptoms with weight bearing.  Has not attempted to self treat. Denies any additional pedal complaints.          PCP: Brandy Dejesus MD    Date Last Seen by PCP: 8/21/18      Hemoglobin A1C   Date Value Ref Range Status   10/06/2017 5.3 4.0 - 5.6 % Final     Comment:     According to ADA guidelines, hemoglobin A1c <7.0% represents  optimal control in non-pregnant diabetic patients. Different  metrics may apply to specific patient populations.   Standards of Medical Care in Diabetes-2016.  For the purpose of screening for the presence of diabetes:  <5.7%     Consistent with the absence of diabetes  5.7-6.4%  Consistent with increasing risk for diabetes   (prediabetes)  >or=6.5%  Consistent with diabetes  Currently, no consensus exists for use of hemoglobin A1c  for diagnosis of diabetes for children.  This Hemoglobin A1c assay has significant interference with fetal   hemoglobin   (HbF). The results are invalid for patients with  abnormal amounts of   HbF,   including those with known Hereditary Persistence   of Fetal Hemoglobin. Heterozygous hemoglobin variants (HbAS, HbAC,   HbAD, HbAE, HbA2) do not significantly interfere with this assay;   however, presence of multiple variants in a sample may impact the %   interference.     10/06/2017 5.3 4.0 - 5.6 % Final     Comment:     According to ADA guidelines, hemoglobin A1c <7.0% represents  optimal control in non-pregnant diabetic patients. Different  metrics may apply to specific patient populations.   Standards of Medical Care in Diabetes-2016.  For the purpose of screening for the presence of diabetes:  <5.7%     Consistent with the absence of diabetes  5.7-6.4%  Consistent with increasing risk for diabetes   (prediabetes)  >or=6.5%  Consistent with diabetes  Currently, no consensus exists for use of hemoglobin A1c  for diagnosis of diabetes for children.  This Hemoglobin A1c assay has significant interference with fetal   hemoglobin   (HbF). The results are invalid for patients with abnormal amounts of   HbF,   including those with known Hereditary Persistence   of Fetal Hemoglobin. Heterozygous hemoglobin variants (HbAS, HbAC,   HbAD, HbAE, HbA2) do not significantly interfere with this assay;   however, presence of multiple variants in a sample may impact the %   interference.     01/17/2017 5.2 4.5 - 6.2 % Final     Comment:     According to ADA guidelines, hemoglobin A1C <7.0% represents  optimal control in non-pregnant diabetic patients.  Different  metrics may apply to specific populations.   Standards of Medical Care in Diabetes - 2016.  For the purpose of screening for the presence of diabetes:  <5.7%     Consistent with the absence of diabetes  5.7-6.4%  Consistent with increasing risk for diabetes   (prediabetes)  >or=6.5%  Consistent with diabetes  Currently no consensus exists for use of hemoglobin A1C  for diagnosis of diabetes for children.             Past Medical History:  "  Diagnosis Date    Anxiety and depression 2015    Arthritis     Cervical radiculopathy 2016    Cirrhosis of liver     Colon polyps 2015    Diabetes mellitus type 2 with neurological manifestations 2015    no current medications, only one episode of elevated sugars with acute illness, will monitor     Encounter for blood transfusion     Hepatitis C     s/p treatment per patient report; managed by Dr. Perez    Hip fracture     Macrocytosis 2015    Thyroid disease     Transfusion reaction     hep c       Past Surgical History:   Procedure Laterality Date    APPENDECTOMY      BACK SURGERY      CAUDAL EPIDURAL STEROID INJECTION N/A 2018    Procedure: Injection-steroid-epidural-caudal;  Surgeon: Roxana Gu Jr., MD;  Location: [facility] OR;  Service: Pain Management;  Laterality: N/A;  with cath target L4-5     SECTION      CHOLECYSTECTOMY      COLONOSCOPY  3/31/10    2 polyps, tubular adenoma    COLONOSCOPY  2015    Dr. Washington    COLONOSCOPY N/A 2013    [facility] ([facility] F[facility])    COLONOSCOPY N/A 2013    [facility] (4[facility] FLR)    EGD (ESOPHAGOGASTRODUODENOSCOPY) N/A 2013    [facility] END[facility] (4TH FLR)    EGD and colonoscopy same day N/A 2015    [facility]    ESOPHAGOGASTRODUODENOSCOPY (EGD) N/A 2015    [facility] ENDO    HERNIA REPAIR      HYSTERECTOMY      Uterus and both ovaries    INCISIONAL HERNIA REPAIR      x 2    Injection-steroid-epidural-caudal N/A 2018    [facility] at [facility] OR    JOINT REPLACEMENT      LIVER BIOPSY  2003    autoimmune hepatitis    ROTATOR CUFF REPAIR      right    STOMACH SURGERY      "took lymph node off of liver"    TOTAL HIP ARTHROPLASTY      left hip    UPPER " GASTROINTESTINAL ENDOSCOPY  3/24/10    normal    UPPER GASTROINTESTINAL ENDOSCOPY  04/27/2015    Dr. Washington       Family History   Problem Relation Age of Onset    Diabetes Mellitus Mother     Liver cancer Sister     Pancreatic cancer Brother     Lung cancer Brother     Brain cancer Brother     Stomach cancer Other     Throat cancer Brother     Liver disease Neg Hx     Colon cancer Neg Hx        Social History     Socioeconomic History    Marital status: Significant Other     Spouse name: None    Number of children: None    Years of education: None    Highest education level: None   Social Needs    Financial resource strain: None    Food insecurity - worry: None    Food insecurity - inability: None    Transportation needs - medical: None    Transportation needs - non-medical: None   Occupational History    None   Tobacco Use    Smoking status: Current Every Day Smoker     Packs/day: 2.00     Years: 45.00     Pack years: 90.00     Types: Cigarettes    Smokeless tobacco: Never Used   Substance and Sexual Activity    Alcohol use: No     Comment: used to drink heavily, stopped in 1990's    Drug use: No    Sexual activity: None   Other Topics Concern    None   Social History Narrative    Youngest out of 16 children to her parents       Current Outpatient Medications   Medication Sig Dispense Refill    ALPRAZolam (XANAX) 1 MG tablet TAKE ONE TABLET BY MOUTH TWICE DAILY AS NEEDED 45 tablet 5    bisacodyl (DULCOLAX) 5 mg EC tablet Take 5 mg by mouth daily as needed for Constipation.      ergocalciferol (ERGOCALCIFEROL) 50,000 unit Cap Take 1 capsule (50,000 Units total) by mouth every 7 days. 15 capsule 3    [START ON 10/20/2018] HYDROcodone-acetaminophen (NORCO)  mg per tablet Take 1 tablet by mouth every 6 (six) hours as needed for Pain. 90 tablet 0    HYDROcodone-acetaminophen (NORCO)  mg per tablet Take 1 tablet by mouth every 6 (six) hours as needed for pain. 90 tablet 0     levothyroxine (SYNTHROID) 75 MCG tablet take 1 tablet by mouth once daily 90 tablet 3    lubiprostone (AMITIZA) 24 MCG Cap Take 1 capsule (24 mcg total) by mouth 2 (two) times daily with meals. 60 capsule 3    ondansetron (ZOFRAN-ODT) 8 MG TbDL dissolve 1 tablet ON TONGUE three times a day if needed for nausea 20 tablet 2    pantoprazole (PROTONIX) 40 MG tablet Take 1 tablet (40 mg total) by mouth 2 (two) times daily. 60 tablet 1     No current facility-administered medications for this visit.        Review of patient's allergies indicates:   Allergen Reactions    Penicillins      Other reaction(s): Hives    Sulfa (sulfonamide antibiotics)      Other reaction(s): Hives         Review of Systems   Constitution: Negative for chills and fever.   Cardiovascular: Negative for claudication and leg swelling.   Skin: Positive for color change and nail changes.   Musculoskeletal: Positive for arthritis, back pain and joint pain. Negative for joint swelling, muscle cramps and muscle weakness.   Neurological: Positive for paresthesias. Negative for numbness.   Psychiatric/Behavioral: Negative for altered mental status.           Objective:      Physical Exam   Constitutional: She is oriented to person, place, and time. She appears well-developed and well-nourished. No distress.   Cardiovascular:   Pulses:       Dorsalis pedis pulses are 2+ on the right side, and 2+ on the left side.        Posterior tibial pulses are 1+ on the right side, and 1+ on the left side.   CFT is < 3 seconds bilateral.  Pedal hair growth is decreased bilateral.  No varicosities noted bilateral.  Mild nonpitting lower extremity edema noted bilateral.  Toes are cool to touch bilateral.     Musculoskeletal: She exhibits edema. She exhibits no tenderness.   Muscle strength 5/5 in all muscle groups bilateral.  No tenderness nor crepitation with ROM of foot/ankle joints bilateral.  No tenderness with palpation of bilateral foot and ankle.  Bilateral  pes planus foot type.  Bilateral rectus alignment of the toes.    Neurological: She is alert and oriented to person, place, and time. She has normal strength. A sensory deficit is present.   Protective sensation per [place] monofilament is decreased bilateral.  Vibratory sensation is decreased bilateral.  Light touch is intact bilateral.   Skin: Skin is warm, dry and intact. Capillary refill takes less than 2 seconds. No abrasion, no bruising, no burn, no ecchymosis, no laceration, no lesion, no petechiae and no rash noted. She is not diaphoretic. No erythema.   Pedal skin appears thin bilateral.  Toenails x 10 appear thickened by 2 mm's, elongated by 1 cm and discolored with subungual debris. Examination of the skin reveals no evidence of significant maceration, rashes, open lesions, suspicious appearing nevi or other concerning lesions.              Assessment:       Encounter Diagnoses   Name Primary?    Diabetic polyneuropathy associated with type 2 diabetes mellitus Yes    Onychomycosis due to dermatophyte     Paresthesia of both feet          Plan:       Thom was seen today for diabetic foot exam and diabetes mellitus.    Diagnoses and all orders for this visit:    Diabetic polyneuropathy associated with type 2 diabetes mellitus    Onychomycosis due to dermatophyte    Paresthesia of both feet      I counseled the patient on her conditions, their implications and medical management.    Given both verbal and written information regarding alpha lipoic acid and B-complex vitamins.  Take as instructed in clinic.    Shoe inspection. Diabetic Foot Education. Patient reminded of the importance of good nutrition and blood sugar control to help prevent podiatric complications of diabetes. Patient instructed on proper foot hygeine. We discussed wearing proper shoe gear, daily foot inspections, never walking without protective shoe gear, never putting sharp instruments to feet    With patient's permission,  nails were aggressively reduced and debrided x 10 to their soft tissue attachment mechanically and with electric , removing all offending nail and debris. Patient relates relief following the procedure. She will continue to monitor the areas daily, inspect her feet, wear protective shoe gear when ambulatory, moisturizer to maintain skin integrity and follow in this office in approximately 2-3 months, sooner p.r.n.    Follow-up in about 3 months (around 12/12/2018).    Lei Denise DPM         done

## 2021-05-05 NOTE — OB NEONATOLOGY/PEDIATRICIAN DELIVERY SUMMARY - NSPEDSNEONOTESA_OBGYN_ALL_OB_FT
Baby is a 40+2 week GA male born to a 42y/o  mother via C/S for failure to progress after IOL for post dates. Maternal history notable for breast cystectomy. Maternal blood type O+. Prenatal labs negative, nonreactive and immune. GBS negative on . AROM at 05:20 with scant bloody fluid. Baby born vigorous and crying spontaneously. Warmed, dried, stimulated, suctioned. Maternal temp of 38.7C on day of delivery at 03:40, received Unasyn at 04:05. EOS 0.52. Apgars 9/9. Bottle and breastfeeding. No to hepB. Yes to circ. Baby is a 40+2 week GA male born to a 42y/o  mother via C/S for failure to progress after IOL for post dates. Maternal history notable for breast cystectomy. Maternal blood type O+. Prenatal labs negative, nonreactive and immune. GBS negative on . AROM at 05:20 with scant bloody fluid. Baby born vigorous and crying spontaneously. Warmed, dried, stimulated, suctioned. Maternal temp of 38.7C on day of delivery at 03:40, received Unasyn >2 hrs prior to delivery. EOS 0.52. Apgars 9/9. Bottle and breastfeeding. No to hepB. Yes to circ.

## 2021-05-05 NOTE — OB RN DELIVERY SUMMARY - NS_SEPSISRSKCALC_OBGYN_ALL_OB_FT
EOS calculated successfully. EOS Risk Factor: 0.5/1000 live births ([facility] national incidence); GA=40w2d; Temp=101.66; ROM=3.267; GBS='Negative'; Antibiotics='Broad spectrum antibiotics > 4 hrs prior to birth'

## 2021-05-05 NOTE — OB PROVIDER LABOR PROGRESS NOTE - NS_OBIHIFHRDETAILS_OBGYN_ALL_OB_FT
150/mod/+accels/-decels
Cat. 1
160, mod joel, - accels, - decels HYACINTH
160, min to moderate, + accels, - decels
150/moderate variability/+accels/no decels

## 2021-05-05 NOTE — OB PROVIDER LABOR PROGRESS NOTE - NS_SUBJECTIVE/OBJECTIVE_OBGYN_ALL_OB_FT
Patient evaluated for increased pressure.
Pt evaluated for cervical change with fever to 38.7
PA note  pt seen and examined for cervical balloon placement. pt resting in bed with epidural in placed denies pain  cervical balloon placed without incident 60 cc in uterine and vaginal balllon. pt tolerated placement well
Pt. comfortable w/ epidural
PA Note:  Patient seen and evaluated at bedside for AROM. Patient comfortable with epidural in place.     AROM with scant fluid
pt examined for increased pressure

## 2021-05-05 NOTE — OB RN INTRAOPERATIVE NOTE - NSINITIALFHR_OBGYN_ALL_OB_FT
Additional Notes: Referred back to Dr. Kiet Pascual for evaluation of re excision. Lesion appears erythematous, tender with no drainage. Injected kenalog into lesion and will hold off on antibiotics given patient is 20 weeks pregnant. Patient to call office and her OBGYN if no improvement to discuss antibiotic options Detail Level: Simple 145 Additional Notes: Patient consent was obtained to proceed with the visit and recommended plan of care after discussion of all risks and benefits, including the risks of COVID-19 exposure.

## 2021-05-06 LAB
ANISOCYTOSIS BLD QL: SLIGHT — SIGNIFICANT CHANGE UP
BASOPHILS # BLD AUTO: 0 K/UL — SIGNIFICANT CHANGE UP (ref 0–0.2)
BASOPHILS NFR BLD AUTO: 0 % — SIGNIFICANT CHANGE UP (ref 0–2)
DACRYOCYTES BLD QL SMEAR: SLIGHT — SIGNIFICANT CHANGE UP
EOSINOPHIL # BLD AUTO: 0 K/UL — SIGNIFICANT CHANGE UP (ref 0–0.5)
EOSINOPHIL NFR BLD AUTO: 0 % — SIGNIFICANT CHANGE UP (ref 0–6)
GIANT PLATELETS BLD QL SMEAR: PRESENT — SIGNIFICANT CHANGE UP
HCT VFR BLD CALC: 21.2 % — LOW (ref 34.5–45)
HCT VFR BLD CALC: 22.1 % — LOW (ref 34.5–45)
HGB BLD-MCNC: 6.9 G/DL — CRITICAL LOW (ref 11.5–15.5)
HGB BLD-MCNC: 7.1 G/DL — LOW (ref 11.5–15.5)
HYPOCHROMIA BLD QL: SLIGHT — SIGNIFICANT CHANGE UP
LYMPHOCYTES # BLD AUTO: 1.98 K/UL — SIGNIFICANT CHANGE UP (ref 1–3.3)
LYMPHOCYTES # BLD AUTO: 11.3 % — LOW (ref 13–44)
MACROCYTES BLD QL: SLIGHT — SIGNIFICANT CHANGE UP
MANUAL SMEAR VERIFICATION: SIGNIFICANT CHANGE UP
MCHC RBC-ENTMCNC: 28.5 PG — SIGNIFICANT CHANGE UP (ref 27–34)
MCHC RBC-ENTMCNC: 29 PG — SIGNIFICANT CHANGE UP (ref 27–34)
MCHC RBC-ENTMCNC: 32.1 GM/DL — SIGNIFICANT CHANGE UP (ref 32–36)
MCHC RBC-ENTMCNC: 32.5 GM/DL — SIGNIFICANT CHANGE UP (ref 32–36)
MCV RBC AUTO: 88.8 FL — SIGNIFICANT CHANGE UP (ref 80–100)
MCV RBC AUTO: 89.1 FL — SIGNIFICANT CHANGE UP (ref 80–100)
MONOCYTES # BLD AUTO: 0.75 K/UL — SIGNIFICANT CHANGE UP (ref 0–0.9)
MONOCYTES NFR BLD AUTO: 4.3 % — SIGNIFICANT CHANGE UP (ref 2–14)
NEUTROPHILS # BLD AUTO: 14.8 K/UL — HIGH (ref 1.8–7.4)
NEUTROPHILS NFR BLD AUTO: 83.5 % — HIGH (ref 43–77)
NEUTS BAND # BLD: 0.9 % — SIGNIFICANT CHANGE UP (ref 0–8)
NRBC # BLD: 0 /100 WBCS — SIGNIFICANT CHANGE UP (ref 0–0)
OVALOCYTES BLD QL SMEAR: SLIGHT — SIGNIFICANT CHANGE UP
PLAT MORPH BLD: ABNORMAL
PLATELET # BLD AUTO: 275 K/UL — SIGNIFICANT CHANGE UP (ref 150–400)
PLATELET # BLD AUTO: 292 K/UL — SIGNIFICANT CHANGE UP (ref 150–400)
POIKILOCYTOSIS BLD QL AUTO: SLIGHT — SIGNIFICANT CHANGE UP
POLYCHROMASIA BLD QL SMEAR: SLIGHT — SIGNIFICANT CHANGE UP
RBC # BLD: 2.38 M/UL — LOW (ref 3.8–5.2)
RBC # BLD: 2.49 M/UL — LOW (ref 3.8–5.2)
RBC # FLD: 13.1 % — SIGNIFICANT CHANGE UP (ref 10.3–14.5)
RBC # FLD: 13.2 % — SIGNIFICANT CHANGE UP (ref 10.3–14.5)
RBC BLD AUTO: ABNORMAL
SCHISTOCYTES BLD QL AUTO: SLIGHT — SIGNIFICANT CHANGE UP
SMUDGE CELLS # BLD: PRESENT — SIGNIFICANT CHANGE UP
TARGETS BLD QL SMEAR: SLIGHT — SIGNIFICANT CHANGE UP
WBC # BLD: 17.54 K/UL — HIGH (ref 3.8–10.5)
WBC # BLD: 18 K/UL — HIGH (ref 3.8–10.5)
WBC # FLD AUTO: 17.54 K/UL — HIGH (ref 3.8–10.5)
WBC # FLD AUTO: 18 K/UL — HIGH (ref 3.8–10.5)

## 2021-05-06 RX ORDER — OXYCODONE HYDROCHLORIDE 5 MG/1
5 TABLET ORAL
Refills: 0 | Status: DISCONTINUED | OUTPATIENT
Start: 2021-05-06 | End: 2021-05-07

## 2021-05-06 RX ORDER — IBUPROFEN 200 MG
600 TABLET ORAL EVERY 6 HOURS
Refills: 0 | Status: DISCONTINUED | OUTPATIENT
Start: 2021-05-06 | End: 2021-05-07

## 2021-05-06 RX ORDER — FERROUS SULFATE 325(65) MG
325 TABLET ORAL THREE TIMES A DAY
Refills: 0 | Status: DISCONTINUED | OUTPATIENT
Start: 2021-05-06 | End: 2021-05-07

## 2021-05-06 RX ORDER — ASCORBIC ACID 60 MG
500 TABLET,CHEWABLE ORAL DAILY
Refills: 0 | Status: DISCONTINUED | OUTPATIENT
Start: 2021-05-06 | End: 2021-05-07

## 2021-05-06 RX ORDER — AMPICILLIN SODIUM AND SULBACTAM SODIUM 250; 125 MG/ML; MG/ML
3 INJECTION, POWDER, FOR SUSPENSION INTRAMUSCULAR; INTRAVENOUS ONCE
Refills: 0 | Status: COMPLETED | OUTPATIENT
Start: 2021-05-06 | End: 2021-05-06

## 2021-05-06 RX ADMIN — Medication 975 MILLIGRAM(S): at 12:14

## 2021-05-06 RX ADMIN — Medication 975 MILLIGRAM(S): at 06:26

## 2021-05-06 RX ADMIN — AMPICILLIN SODIUM AND SULBACTAM SODIUM 200 GRAM(S): 250; 125 INJECTION, POWDER, FOR SUSPENSION INTRAMUSCULAR; INTRAVENOUS at 05:53

## 2021-05-06 RX ADMIN — Medication 500 MILLIGRAM(S): at 14:22

## 2021-05-06 RX ADMIN — Medication 30 MILLIGRAM(S): at 21:54

## 2021-05-06 RX ADMIN — HEPARIN SODIUM 5000 UNIT(S): 5000 INJECTION INTRAVENOUS; SUBCUTANEOUS at 18:09

## 2021-05-06 RX ADMIN — Medication 975 MILLIGRAM(S): at 18:09

## 2021-05-06 RX ADMIN — OXYCODONE HYDROCHLORIDE 5 MILLIGRAM(S): 5 TABLET ORAL at 14:23

## 2021-05-06 RX ADMIN — Medication 30 MILLIGRAM(S): at 09:20

## 2021-05-06 RX ADMIN — Medication 30 MILLIGRAM(S): at 15:30

## 2021-05-06 RX ADMIN — Medication 30 MILLIGRAM(S): at 09:30

## 2021-05-06 RX ADMIN — Medication 30 MILLIGRAM(S): at 03:02

## 2021-05-06 RX ADMIN — Medication 30 MILLIGRAM(S): at 21:20

## 2021-05-06 RX ADMIN — Medication 325 MILLIGRAM(S): at 21:20

## 2021-05-06 RX ADMIN — Medication 975 MILLIGRAM(S): at 05:51

## 2021-05-06 RX ADMIN — Medication 30 MILLIGRAM(S): at 14:59

## 2021-05-06 RX ADMIN — SIMETHICONE 80 MILLIGRAM(S): 80 TABLET, CHEWABLE ORAL at 00:03

## 2021-05-06 RX ADMIN — SIMETHICONE 80 MILLIGRAM(S): 80 TABLET, CHEWABLE ORAL at 14:22

## 2021-05-06 RX ADMIN — Medication 325 MILLIGRAM(S): at 14:22

## 2021-05-06 RX ADMIN — Medication 975 MILLIGRAM(S): at 01:00

## 2021-05-06 RX ADMIN — Medication 975 MILLIGRAM(S): at 12:30

## 2021-05-06 RX ADMIN — HEPARIN SODIUM 5000 UNIT(S): 5000 INJECTION INTRAVENOUS; SUBCUTANEOUS at 05:53

## 2021-05-06 RX ADMIN — Medication 30 MILLIGRAM(S): at 02:32

## 2021-05-06 NOTE — PROGRESS NOTE ADULT - ATTENDING COMMENTS
I agree with the resident's note above.    Patient is doing well. Pain is well controlled. Tolerating regular diet, ambulating, and voiding.  Vital signs stable  Incision is c/d/i.    H/H low at 6.9 and 7.1 but patient is asymptomatic    Plan:  Iron + vit C for acute blood loss anemia  Will monitor symptoms --> discussed if symptomatic will need 1UPRBC  Reg diet  Ambulating  Pain control  Routine postpartum care    gchow

## 2021-05-07 ENCOUNTER — TRANSCRIPTION ENCOUNTER (OUTPATIENT)
Age: 41
End: 2021-05-07

## 2021-05-07 VITALS
HEART RATE: 86 BPM | RESPIRATION RATE: 18 BRPM | OXYGEN SATURATION: 96 % | TEMPERATURE: 98 F | DIASTOLIC BLOOD PRESSURE: 81 MMHG | SYSTOLIC BLOOD PRESSURE: 116 MMHG

## 2021-05-07 PROCEDURE — U0005: CPT

## 2021-05-07 PROCEDURE — 59025 FETAL NON-STRESS TEST: CPT

## 2021-05-07 PROCEDURE — 59050 FETAL MONITOR W/REPORT: CPT

## 2021-05-07 PROCEDURE — 86780 TREPONEMA PALLIDUM: CPT

## 2021-05-07 PROCEDURE — 86900 BLOOD TYPING SEROLOGIC ABO: CPT

## 2021-05-07 PROCEDURE — U0003: CPT

## 2021-05-07 PROCEDURE — 85025 COMPLETE CBC W/AUTO DIFF WBC: CPT

## 2021-05-07 PROCEDURE — 86850 RBC ANTIBODY SCREEN: CPT

## 2021-05-07 PROCEDURE — 85027 COMPLETE CBC AUTOMATED: CPT

## 2021-05-07 PROCEDURE — G0463: CPT

## 2021-05-07 PROCEDURE — 86901 BLOOD TYPING SEROLOGIC RH(D): CPT

## 2021-05-07 PROCEDURE — C1889: CPT

## 2021-05-07 PROCEDURE — 86769 SARS-COV-2 COVID-19 ANTIBODY: CPT

## 2021-05-07 RX ADMIN — Medication 600 MILLIGRAM(S): at 09:00

## 2021-05-07 RX ADMIN — HEPARIN SODIUM 5000 UNIT(S): 5000 INJECTION INTRAVENOUS; SUBCUTANEOUS at 06:00

## 2021-05-07 RX ADMIN — Medication 975 MILLIGRAM(S): at 06:00

## 2021-05-07 RX ADMIN — Medication 600 MILLIGRAM(S): at 04:42

## 2021-05-07 RX ADMIN — Medication 600 MILLIGRAM(S): at 08:28

## 2021-05-07 RX ADMIN — Medication 975 MILLIGRAM(S): at 12:51

## 2021-05-07 RX ADMIN — Medication 975 MILLIGRAM(S): at 00:12

## 2021-05-07 RX ADMIN — Medication 600 MILLIGRAM(S): at 03:52

## 2021-05-07 RX ADMIN — Medication 500 MILLIGRAM(S): at 11:56

## 2021-05-07 RX ADMIN — Medication 975 MILLIGRAM(S): at 00:42

## 2021-05-07 RX ADMIN — Medication 975 MILLIGRAM(S): at 11:56

## 2021-05-07 RX ADMIN — Medication 325 MILLIGRAM(S): at 05:59

## 2021-05-07 NOTE — DISCHARGE NOTE OB - CARE PROVIDER_API CALL
Hadley Castañeda)  Obstetrics and Gynecology  [address] [place] [address], [address]  [place] [address]  Phone: [phone]  Fax: [phone]  Follow Up Time:

## 2021-05-07 NOTE — PROGRESS NOTE ADULT - ASSESSMENT
A: Patient is POD#2 s/p pLTCS c/b isolated maternal temp s/p treatment with Unasyn, feeling well and meeting appropriate postoperative milestones.     Plan:   - continue current pain regimen  - continue regular diet  - increase ambulation     TONNY Holland, PGY1
A: Patient is POD#1 s/p pLTCS 2/2 arrest of dilation with labor course c/b isolated maternal fever s/p treatment with Unasyn. Patient has been afebrile, is feeling well and meeting appropriate postoperative milestones.     Plan:   - AM CBC  - continue current pain regimen  - continue regular diet  - increase ambulation     TONNY Holland, PGY1

## 2021-05-07 NOTE — PROGRESS NOTE ADULT - SUBJECTIVE AND OBJECTIVE BOX
R1 Progress Note    Patient is POD#1 s/p pLTCS 2/2 arrest of dilation with labor course c/b isolated maternal fever s/p treatment with Unasyn, seen and examined at bedside. No acute overnight events. No acute complaints, pain well controlled. Patient is ambulating and tolerating regular diet, voiding spontaneously and passing flatus. Denies CP, SOB, N/V, HA, blurred vision, epigastric pain.    Vital Signs Last 24 Hours  T(C): 36.7 (05-06-21 @ 01:00), Max: 37.8 (05-05-21 @ 06:58)  HR: 84 (05-06-21 @ 01:00) (63 - 119)  BP: 101/61 (05-06-21 @ 01:00) (101/61 - 134/61)  RR: 18 (05-06-21 @ 01:00) (13 - 24)  SpO2: 95% (05-06-21 @ 01:00) (84% - 100%)    I&O's Summary    04 May 2021 07:01  -  05 May 2021 07:00  --------------------------------------------------------  IN: 3915.4 mL / OUT: 1250 mL / NET: 2665.4 mL    05 May 2021 07:01  -  06 May 2021 04:31  --------------------------------------------------------  IN: 1526.8 mL / OUT: 1700 mL / NET: -173.2 mL        Physical Exam:  General: NAD  Abdomen: Soft, appropriately tender, non-distended, fundus firm  Incision: Pfannenstiel incision CDI, subcuticular suture closure  Pelvic: Lochia wnl    Labs:    Blood Type: O Positive  Antibody Screen: Negative  RPR: Negative               11.0   13.75 )-----------( 361      ( 05-04 @ 12:45 )             35.2         MEDICATIONS  (STANDING):  acetaminophen   Tablet .. 975 milliGRAM(s) Oral <User Schedule>  ampicillin/sulbactam  IVPB 3 Gram(s) IV Intermittent once  diphtheria/tetanus/pertussis (acellular) Vaccine (ADAcel) 0.5 milliLiter(s) IntraMuscular once  heparin   Injectable 5000 Unit(s) SubCutaneous every 12 hours  ibuprofen  Tablet. 600 milliGRAM(s) Oral every 6 hours  ketorolac   Injectable 30 milliGRAM(s) IV Push every 6 hours  lactated ringers Bolus 1000 milliLiter(s) IV Bolus once  lactated ringers. 1000 milliLiter(s) (125 mL/Hr) IV Continuous <Continuous>  morphine PF Epidural 2 milliGRAM(s) Epidural once  oxytocin Infusion 333.333 milliUNIT(s)/Min (1000 mL/Hr) IV Continuous <Continuous>  oxytocin Infusion. 4 milliUNIT(s)/Min (4 mL/Hr) IV Continuous <Continuous>    MEDICATIONS  (PRN):  dexAMETHasone  Injectable 4 milliGRAM(s) IV Push every 6 hours PRN Nausea  diphenhydrAMINE 25 milliGRAM(s) Oral every 6 hours PRN Pruritus  lanolin Ointment 1 Application(s) Topical every 6 hours PRN Sore Nipples  magnesium hydroxide Suspension 30 milliLiter(s) Oral two times a day PRN Constipation  nalbuphine Injectable 2.5 milliGRAM(s) IV Push every 6 hours PRN Pruritus  naloxone Injectable 0.1 milliGRAM(s) IV Push every 3 minutes PRN For ANY of the following changes in patient status:  A. Breaths Per Minute LESS THAN 10, B. Oxygen saturation LESS THAN 90%, C. Sedation score of 6 for Stop After: 4 Times  ondansetron Injectable 4 milliGRAM(s) IV Push every 6 hours PRN Nausea  oxyCODONE    IR 5 milliGRAM(s) Oral every 3 hours PRN Mild Pain (1 - 3)  oxyCODONE    IR 10 milliGRAM(s) Oral every 3 hours PRN Moderate Pain (4 - 6)  oxyCODONE    IR 5 milliGRAM(s) Oral every 3 hours PRN Moderate to Severe Pain (4-10)  oxyCODONE    IR 5 milliGRAM(s) Oral once PRN Moderate to Severe Pain (4-10)  simethicone 80 milliGRAM(s) Chew every 4 hours PRN Gas      
Day 1 of Anesthesia Pain Management Service    SUBJECTIVE:  Pain Scale Score:          [X] Refer to charted pain scores    THERAPY:    s/p ___mg PF morphine on ___      MEDICATIONS  (STANDING):  acetaminophen   Tablet .. 975 milliGRAM(s) Oral <User Schedule>  diphtheria/tetanus/pertussis (acellular) Vaccine (ADAcel) 0.5 milliLiter(s) IntraMuscular once  heparin   Injectable 5000 Unit(s) SubCutaneous every 12 hours  ibuprofen  Tablet. 600 milliGRAM(s) Oral every 6 hours  ketorolac   Injectable 30 milliGRAM(s) IV Push every 6 hours  lactated ringers Bolus 1000 milliLiter(s) IV Bolus once  lactated ringers. 1000 milliLiter(s) (125 mL/Hr) IV Continuous <Continuous>  oxytocin Infusion 333.333 milliUNIT(s)/Min (1000 mL/Hr) IV Continuous <Continuous>  oxytocin Infusion. 4 milliUNIT(s)/Min (4 mL/Hr) IV Continuous <Continuous>    MEDICATIONS  (PRN):  diphenhydrAMINE 25 milliGRAM(s) Oral every 6 hours PRN Pruritus  lanolin Ointment 1 Application(s) Topical every 6 hours PRN Sore Nipples  magnesium hydroxide Suspension 30 milliLiter(s) Oral two times a day PRN Constipation  oxyCODONE    IR 5 milliGRAM(s) Oral every 3 hours PRN Moderate to Severe Pain (4-10)  oxyCODONE    IR 5 milliGRAM(s) Oral once PRN Moderate to Severe Pain (4-10)  simethicone 80 milliGRAM(s) Chew every 4 hours PRN Gas      OBJECTIVE:    Sedation:        	[X] Alert	[ ] Drowsy	[ ] Arousable      [ ] Asleep       [ ] Unresponsive    Side Effects:	[X] None	[ ] Nausea	[ ] Vomiting         [ ] Pruritus  		[ ] Weakness            [ ] Numbness	          [ ] Other:    Vital Signs Last 24 Hrs  T(C): 37 (06 May 2021 09:17), Max: 37 (06 May 2021 09:17)  T(F): 98.6 (06 May 2021 09:17), Max: 98.6 (06 May 2021 09:17)  HR: 84 (06 May 2021 09:17) (74 - 88)  BP: 126/79 (06 May 2021 09:17) (101/61 - 128/71)  BP(mean): 94 (05 May 2021 11:45) (80 - 94)  RR: 18 (06 May 2021 09:17) (16 - 20)  SpO2: 99% (06 May 2021 09:17) (95% - 99%)    ASSESSMENT/ PLAN  [X] Patient transitioned to prn analgesics  [X] Pain management per primary service, pain service to sign off   [X]Documentation and Verification of current medications
 R1 Progress Note    Patient is POD#2 s/p pLTCS 2/2 arrest, seen and examined at bedside. No acute overnight events. Unasyn was discontinued as patient was afebrile for 24 hours. No acute complaints, pain well controlled. Patient is ambulating and tolerating regular diet, voiding spontaneously and passing flatus. Denies CP, SOB, N/V, HA, blurred vision, epigastric pain.    Vital Signs Last 24 Hours  T(C): 36.6 (05-06-21 @ 21:08), Max: 37 (05-06-21 @ 09:17)  HR: 86 (05-06-21 @ 21:08) (62 - 86)  BP: 121/82 (05-06-21 @ 21:08) (109/74 - 126/79)  RR: 18 (05-06-21 @ 21:08) (18 - 18)  SpO2: 98% (05-06-21 @ 21:08) (96% - 99%)    I&O's Summary    05 May 2021 07:01  -  06 May 2021 07:00  --------------------------------------------------------  IN: 1526.8 mL / OUT: 1700 mL / NET: -173.2 mL        Physical Exam:  General: NAD  Abdomen: Soft, appropriately tender, non-distended, fundus firm  Incision: Pfannenstiel incision CDI, subcuticular suture closure  Pelvic: Lochia wnl    Labs:    Blood Type: O Positive  Antibody Screen: Negative  RPR: Negative               7.1    18.00 )-----------( 292      ( 05-06 @ 09:40 )             22.1                6.9    17.54 )-----------( 275      ( 05-06 @ 06:12 )             21.2                11.0   13.75 )-----------( 361      ( 05-04 @ 12:45 )             35.2         MEDICATIONS  (STANDING):  acetaminophen   Tablet .. 975 milliGRAM(s) Oral <User Schedule>  ascorbic acid 500 milliGRAM(s) Oral daily  diphtheria/tetanus/pertussis (acellular) Vaccine (ADAcel) 0.5 milliLiter(s) IntraMuscular once  ferrous    sulfate 325 milliGRAM(s) Oral three times a day  heparin   Injectable 5000 Unit(s) SubCutaneous every 12 hours  ibuprofen  Tablet. 600 milliGRAM(s) Oral every 6 hours  ketorolac   Injectable 30 milliGRAM(s) IV Push every 6 hours  lactated ringers Bolus 1000 milliLiter(s) IV Bolus once  lactated ringers. 1000 milliLiter(s) (125 mL/Hr) IV Continuous <Continuous>  oxytocin Infusion 333.333 milliUNIT(s)/Min (1000 mL/Hr) IV Continuous <Continuous>  oxytocin Infusion. 4 milliUNIT(s)/Min (4 mL/Hr) IV Continuous <Continuous>    MEDICATIONS  (PRN):  diphenhydrAMINE 25 milliGRAM(s) Oral every 6 hours PRN Pruritus  lanolin Ointment 1 Application(s) Topical every 6 hours PRN Sore Nipples  magnesium hydroxide Suspension 30 milliLiter(s) Oral two times a day PRN Constipation  oxyCODONE    IR 5 milliGRAM(s) Oral every 3 hours PRN Moderate to Severe Pain (4-10)  oxyCODONE    IR 5 milliGRAM(s) Oral once PRN Moderate to Severe Pain (4-10)  simethicone 80 milliGRAM(s) Chew every 4 hours PRN Gas

## 2021-05-07 NOTE — DISCHARGE NOTE OB - PATIENT PORTAL LINK FT
You can access the FollowMyHealth Patient Portal offered by [facility] by registering at the following website: [email or web address]. By joining [facility]’s FollowMyHealth portal, you will also be able to view your health information using other applications (apps) compatible with our system.

## 2022-02-24 ENCOUNTER — RESULT REVIEW (OUTPATIENT)
Age: 42
End: 2022-02-24

## 2022-07-18 ENCOUNTER — RESULT REVIEW (OUTPATIENT)
Age: 42
End: 2022-07-18

## 2023-01-01 NOTE — ED ADULT NURSE NOTE - CAS TRG GEN SKIN COLOR
In an effort to ensure that our patients LiveWell, a Team Member has reviewed your chart and identified an opportunity to provide the best care possible. An attempt was made to discuss or schedule overdue Preventive or Disease Management screening.     The Outcome was Contact was made, appointment scheduled. Care Gaps include Wellness Visits.    
Normal for race

## 2024-02-04 NOTE — DISCHARGE NOTE OB - MEDICATION SUMMARY - MEDICATIONS TO STOP TAKING
Generally incontinent but currently endorses improved bladder control.  Continue home Oxybutynin.   I will STOP taking the medications listed below when I get home from the hospital:  None
